# Patient Record
Sex: FEMALE | Race: WHITE | NOT HISPANIC OR LATINO | Employment: UNEMPLOYED | URBAN - METROPOLITAN AREA
[De-identification: names, ages, dates, MRNs, and addresses within clinical notes are randomized per-mention and may not be internally consistent; named-entity substitution may affect disease eponyms.]

---

## 2017-10-31 ENCOUNTER — TRANSCRIBE ORDERS (OUTPATIENT)
Dept: ADMINISTRATIVE | Facility: HOSPITAL | Age: 67
End: 2017-10-31

## 2017-10-31 DIAGNOSIS — K21.9 GASTROESOPHAGEAL REFLUX DISEASE, ESOPHAGITIS PRESENCE NOT SPECIFIED: Primary | ICD-10-CM

## 2017-11-08 ENCOUNTER — HOSPITAL ENCOUNTER (OUTPATIENT)
Dept: GASTROENTEROLOGY | Facility: HOSPITAL | Age: 67
Discharge: HOME | End: 2017-11-08
Attending: OTOLARYNGOLOGY
Payer: MEDICARE

## 2017-11-08 VITALS
SYSTOLIC BLOOD PRESSURE: 126 MMHG | WEIGHT: 165 LBS | RESPIRATION RATE: 17 BRPM | HEART RATE: 61 BPM | HEIGHT: 65 IN | BODY MASS INDEX: 27.49 KG/M2 | TEMPERATURE: 97.5 F | OXYGEN SATURATION: 96 % | DIASTOLIC BLOOD PRESSURE: 68 MMHG

## 2017-11-08 PROCEDURE — 91038 ESOPH IMPED FUNCT TEST > 1HR: CPT

## 2017-11-08 PROCEDURE — 91020 GASTRIC MOTILITY STUDIES: CPT

## 2017-11-08 NOTE — PERIOPERATIVE NURSING NOTE
Manometry catheter passed through right side  Pt tolerated 20 liquid swallows  Catheter removed without difficulty  PH probe placed down right side at 17cm  Pt taught Kristen Guevara monitor using teach back method  Pt left in NAD

## 2019-10-27 PROBLEM — H65.92 LEFT OTITIS MEDIA WITH EFFUSION: Status: ACTIVE | Noted: 2019-10-27

## 2019-10-27 PROBLEM — H69.83 DYSFUNCTION OF BOTH EUSTACHIAN TUBES: Status: ACTIVE | Noted: 2019-10-27

## 2019-10-27 PROBLEM — H90.A32 MIXED CONDUCTIVE AND SENSORINEURAL HEARING LOSS OF LEFT EAR WITH RESTRICTED HEARING OF RIGHT EAR: Status: ACTIVE | Noted: 2019-10-27

## 2021-05-20 PROBLEM — C84.04: Status: ACTIVE | Noted: 2021-05-20

## 2021-06-04 ENCOUNTER — HOSPITAL ENCOUNTER (OUTPATIENT)
Dept: CT IMAGING | Facility: HOSPITAL | Age: 71
Discharge: HOME | End: 2021-06-04
Attending: OTOLARYNGOLOGY
Payer: MEDICARE

## 2021-06-04 DIAGNOSIS — H61.301 EXTERNAL EAR CANAL STENOSIS, ACQUIRED, RIGHT: ICD-10-CM

## 2021-06-04 DIAGNOSIS — R42 DIZZINESS: ICD-10-CM

## 2021-06-04 DIAGNOSIS — H93.13 BILATERAL TINNITUS: ICD-10-CM

## 2021-06-04 DIAGNOSIS — H90.6 MIXED HEARING LOSS, BILATERAL: ICD-10-CM

## 2021-06-04 PROCEDURE — 70480 CT ORBIT/EAR/FOSSA W/O DYE: CPT

## 2021-06-04 PROCEDURE — G1004 CDSM NDSC: HCPCS

## 2022-11-27 PROBLEM — J04.0 REFLUX LARYNGITIS: Status: ACTIVE | Noted: 2022-11-27

## 2022-11-27 PROBLEM — H65.92 LEFT OTITIS MEDIA WITH EFFUSION: Status: RESOLVED | Noted: 2019-10-27 | Resolved: 2022-11-27

## 2022-11-27 PROBLEM — R49.0 DYSPHONIA: Status: ACTIVE | Noted: 2022-11-27

## 2022-11-27 PROBLEM — K21.9 GASTROESOPHAGEAL REFLUX DISEASE: Status: ACTIVE | Noted: 2022-11-27

## 2022-11-27 PROBLEM — J38.3 GLOTTIC INSUFFICIENCY: Status: ACTIVE | Noted: 2022-11-27

## 2022-11-27 PROBLEM — R13.14 PHARYNGOESOPHAGEAL DYSPHAGIA: Status: ACTIVE | Noted: 2022-11-27

## 2022-11-27 PROBLEM — K21.9 REFLUX LARYNGITIS: Status: ACTIVE | Noted: 2022-11-27

## 2023-01-10 PROBLEM — R05.9 COUGH: Status: ACTIVE | Noted: 2023-01-10

## 2023-01-10 PROBLEM — J38.3 SULCUS VOCALIS OF VOCAL FOLD: Status: ACTIVE | Noted: 2023-01-10

## 2023-01-20 ENCOUNTER — HOSPITAL ENCOUNTER (OUTPATIENT)
Dept: RADIOLOGY | Facility: HOSPITAL | Age: 73
End: 2023-01-20

## 2023-01-20 DIAGNOSIS — R13.14 PHARYNGOESOPHAGEAL DYSPHAGIA: ICD-10-CM

## 2023-01-20 DIAGNOSIS — K22.4 ESOPHAGEAL DYSMOTILITY: ICD-10-CM

## 2023-01-20 DIAGNOSIS — K21.9 GASTROESOPHAGEAL REFLUX DISEASE, UNSPECIFIED WHETHER ESOPHAGITIS PRESENT: ICD-10-CM
